# Patient Record
Sex: FEMALE | NOT HISPANIC OR LATINO | Employment: OTHER | URBAN - METROPOLITAN AREA
[De-identification: names, ages, dates, MRNs, and addresses within clinical notes are randomized per-mention and may not be internally consistent; named-entity substitution may affect disease eponyms.]

---

## 2017-03-31 ENCOUNTER — HOSPITAL ENCOUNTER (OUTPATIENT)
Dept: MAMMOGRAPHY | Facility: CLINIC | Age: 67
Discharge: HOME | End: 2017-03-31
Attending: PHYSICIAN ASSISTANT | Admitting: PHYSICIAN ASSISTANT
Payer: MEDICARE

## 2017-03-31 DIAGNOSIS — Z12.31 VISIT FOR SCREENING MAMMOGRAM: ICD-10-CM

## 2017-03-31 PROCEDURE — G0202 SCR MAMMO BI INCL CAD: HCPCS

## 2017-03-31 PROCEDURE — 77063 BREAST TOMOSYNTHESIS BI: CPT

## 2017-09-19 ENCOUNTER — HOSPITAL ENCOUNTER (OUTPATIENT)
Dept: BONE DENSITY | Facility: CLINIC | Age: 67
Discharge: HOME | End: 2017-09-19
Attending: PHYSICIAN ASSISTANT | Admitting: PHYSICIAN ASSISTANT
Payer: MEDICARE

## 2017-09-19 DIAGNOSIS — Z78.0 POST-MENOPAUSAL: ICD-10-CM

## 2017-09-19 DIAGNOSIS — M85.80 OSTEOPENIA: ICD-10-CM

## 2017-09-19 PROCEDURE — 77080 DXA BONE DENSITY AXIAL: CPT

## 2019-04-23 ENCOUNTER — THERAPY VISIT (OUTPATIENT)
Dept: PHYSICAL THERAPY | Facility: CLINIC | Age: 69
End: 2019-04-23
Payer: COMMERCIAL

## 2019-04-23 DIAGNOSIS — R29.898 LEG WEAKNESS, BILATERAL: ICD-10-CM

## 2019-04-23 PROCEDURE — 97110 THERAPEUTIC EXERCISES: CPT | Mod: GP | Performed by: PHYSICAL THERAPIST

## 2019-04-23 PROCEDURE — 97161 PT EVAL LOW COMPLEX 20 MIN: CPT | Mod: GP | Performed by: PHYSICAL THERAPIST

## 2019-04-23 NOTE — LETTER
[facility] ATHLETIC [facility] PHYSICAL THERAPY  [address] [place] [address]  [phone]    2019    Re: Axel Pearson   :   1950  MRN:  [id number]   REFERRING PHYSICIAN:   Donald Stanton    [facility] ATHLETIC [facility] PHYSICAL [facility]    Date of Initial Evaluation:  2019  Visits:  Rxs Used: 1  Reason for Referral:  Leg weakness, bilateral    EVALUATION SUMMARY    [facility] Athletic [facility] Initial Evaluation  Subjective:  The history is provided by the patient.   General   Condition requiring PT:  Deconditioning and history of previous falls  Associated condition:  Osteoporosis and fall  Chronicity:  Chronic  Onset date of current episode/exacerbation comment:  Patient fell while stepping onto first step on bus, landing on R knee on 2019. Patient reported she was unable to stand without assistance. Patient then sat on plane for 40 hours following the fall. Patient had an x-ray following the fall after return to [place], which showed that there was no fracture. Patient was prescribed a knee brace, which she wore up until 3 weeks ago. Patient still has knee pain, along with bilateral leg weakness. Patient explains that she has difficulty getting up from chairs and getting up from the floor. Patient also has autoimmune disorders and arthritis, which causes her pain in her hands and feet. Upon examination, patient has notable swelling in R knee and R calf.  Pain location:  Right knee, left hand/wrist, right hand/wrist, right foot/ankle and left foot/ankle (Pain in hands and feet from arthritis)  Pain quality:  Aching  Frequency:  Constant  Number scale:  5/10  Associated symptoms:  Loss of strength, loss of motion, edema and dizziness (blood pressure medication)  Pain is:  The same all the time  Symptoms exacerbated by:  Standing and activity  Symptoms relieved by:  Bracing/immobilizing and rest  Progression since onset:   "Unchanged  Barriers at home/work:  Nothing  Red flags:  None as reported by the patient  Objective:  Hip Evaluation  Hip PROM:  Hip PROM:  Left Hip:    Normal  Right Hip:  Normal  Hip Strength:    Flexion:   Left: 4+/5   +  Pain:  Right: 4+/5   +  Pain:     Re: Axel Pearson   :   1950    Knee Evaluation:  ROM:  Strength wnl knee: Patient has pain from pressure of hand on R during resistance strength testing.  AROM  Extension: Left:    Right:  15 deg, no pain  Flexion: Left:   Right: 130 deg, no pain  Strength:   Extension:  Left: 5/5    Pain:-      Right: 5/5    Pain:+  Flexion:  Left: 5/5   Pain:      Right: 5/5    Pain:+    ROS  Patient has localized edema over R patella and generalized swelling of entire R lower leg. Patient measures 15.25\" on R calf (6\" inferior to joint line) and 14.25\" on L calf (6\" inferior to joint line)  Assessment/Plan:    Patient is a 68 year old female with bilateral LE weakness complaints.    Patient has the following significant findings with corresponding treatment plan.                Diagnosis 1:  Bilateral LE weakness  Pain -  manual therapy, self management, education and home program  Decreased ROM/flexibility - manual therapy, therapeutic exercise and home program  Decreased strength - therapeutic exercise, therapeutic activities and home program  Edema - self management/home program  Decreased function - therapeutic activities and home program  Therapy Evaluation Codes:   Cumulative Therapy Evaluation is: Low complexity.  Previous and current functional limitations:  (See Goal Flow Sheet for this information)    Short term and Long term goals: (See Goal Flow Sheet for this information)   Communication ability:  Patient appears to be able to clearly communicate and understand verbal and written communication and follow directions correctly.  Treatment Explanation - The following has been discussed with the patient:   RX ordered/plan of care  Anticipated " outcomes  Possible risks and side effects  This patient would benefit from PT intervention to resume normal activities.   Rehab potential is good.  Frequency:  1 X week, once daily  Duration:  for 6 weeks  Discharge Plan:  Achieve all LTG.  Independent in home treatment program.  Reach maximal therapeutic benefit.     Thank you for your referral.    INQUIRIES  Therapist:Jerica Pedro, PT  [facility] PHYSICAL THERAPY  [address] [address]  Phone: [phone]  Fax: [phone]

## 2019-04-23 NOTE — PROGRESS NOTES
[facility] Initial Evaluation  Subjective:  The history is provided by the patient.   General   Condition requiring PT:  Deconditioning and history of previous falls  Associated condition:  Osteoporosis and fall  Chronicity:  Chronic  Onset date of current episode/exacerbation comment:  Patient fell while stepping onto first step on bus, landing on R knee on March 6, 2019. Patient reported she was unable to stand without assistance. Patient then sat on plane for 40 hours following the fall. Patient had an x-ray following the fall after return to [place], which showed that there was no fracture. Patient was prescribed a knee brace, which she wore up until 3 weeks ago. Patient still has knee pain, along with bilateral leg weakness. Patient explains that she has difficulty getting up from chairs and getting up from the floor. Patient also has autoimmune disorders and arthritis, which causes her pain in her hands and feet. Upon examination, patient has notable swelling in R knee and R calf.  Pain location:  Right knee, left hand/wrist, right hand/wrist, right foot/ankle and left foot/ankle (Pain in hands and feet from arthritis)  Pain quality:  Aching  Frequency:  Constant  Number scale:  5/10  Associated symptoms:  Loss of strength, loss of motion, edema and dizziness (blood pressure medication)  Pain is:  The same all the time  Symptoms exacerbated by:  Standing and activity  Symptoms relieved by:  Bracing/immobilizing and rest  Progression since onset:  Unchanged  Barriers at home/work:  Nothing  Red flags:  None as reported by the patient                      Objective:  System                                           Hip Evaluation  Hip PROM:  Hip PROM:  Left Hip:    Normal  Right Hip:  Normal                          Hip Strength:    Flexion:   Left: 4+/5   +  Pain:  Right: 4+/5   +  Pain:                                                 Knee Evaluation:  ROM:  Strength wnl knee: Patient has pain  "from pressure of hand on R during resistance strength testing.  AROM      Extension: Left:    Right:  15 deg, no pain  Flexion: Left:   Right: 130 deg, no pain        Strength:     Extension:  Left: 5/5    Pain:-      Right: 5/5    Pain:+  Flexion:  Left: 5/5   Pain:      Right: 5/5    Pain:+                                                                 ROS  Patient has localized edema over R patella and generalized swelling of entire R lower leg. Patient measures 15.25\" on R calf (6\" inferior to joint line) and 14.25\" on L calf (6\" inferior to joint line)    Assessment/Plan:    Patient is a 68 year old female with bilateral LE weakness complaints.    Patient has the following significant findings with corresponding treatment plan.                Diagnosis 1:  Bilateral LE weakness  Pain -  manual therapy, self management, education and home program  Decreased ROM/flexibility - manual therapy, therapeutic exercise and home program  Decreased strength - therapeutic exercise, therapeutic activities and home program  Edema - self management/home program  Decreased function - therapeutic activities and home program    Therapy Evaluation Codes:   Cumulative Therapy Evaluation is: Low complexity.    Previous and current functional limitations:  (See Goal Flow Sheet for this information)    Short term and Long term goals: (See Goal Flow Sheet for this information)     Communication ability:  Patient appears to be able to clearly communicate and understand verbal and written communication and follow directions correctly.  Treatment Explanation - The following has been discussed with the patient:   RX ordered/plan of care  Anticipated outcomes  Possible risks and side effects  This patient would benefit from PT intervention to resume normal activities.   Rehab potential is good.    Frequency:  1 X week, once daily  Duration:  for 6 weeks  Discharge Plan:  Achieve all LTG.  Independent in home treatment program.  Reach " maximal therapeutic benefit.    Please refer to the daily flowsheet for treatment today, total treatment time and time spent performing 1:1 timed codes.

## 2019-04-25 PROBLEM — R29.898 LEG WEAKNESS, BILATERAL: Status: ACTIVE | Noted: 2019-04-25

## 2019-04-26 NOTE — PROGRESS NOTES
[facility] Initial Evaluation  Subjective:                                     General health as reported by patient is fair.  Pertinent medical history includes:  Diabetes, high blood pressure, history of fractures, incontinence, osteoporosis, osteoarthritis, overweight, rheumatoid arthritis, thyroid problems and other (cold/hot extremity, Significant weakness).    Other surgeries include:  Other (c section).  Current medications:  Anti-inflammatory, high blood pressure medication, steroids and other (diabetes medications).                                      Objective:  System    Physical Exam    General     ROS    Assessment/Plan:

## 2019-04-30 ENCOUNTER — THERAPY VISIT (OUTPATIENT)
Dept: PHYSICAL THERAPY | Facility: CLINIC | Age: 69
End: 2019-04-30
Payer: COMMERCIAL

## 2019-04-30 DIAGNOSIS — R29.898 LEG WEAKNESS, BILATERAL: ICD-10-CM

## 2019-04-30 PROCEDURE — 97110 THERAPEUTIC EXERCISES: CPT | Mod: GP | Performed by: PHYSICAL THERAPIST

## 2019-04-30 PROCEDURE — 97530 THERAPEUTIC ACTIVITIES: CPT | Mod: GP | Performed by: PHYSICAL THERAPIST

## 2019-05-10 ENCOUNTER — THERAPY VISIT (OUTPATIENT)
Dept: PHYSICAL THERAPY | Facility: CLINIC | Age: 69
End: 2019-05-10
Payer: COMMERCIAL

## 2019-05-10 DIAGNOSIS — R29.898 LEG WEAKNESS, BILATERAL: ICD-10-CM

## 2019-05-10 PROCEDURE — 97530 THERAPEUTIC ACTIVITIES: CPT | Mod: GP | Performed by: PHYSICAL THERAPIST

## 2019-05-10 PROCEDURE — 97110 THERAPEUTIC EXERCISES: CPT | Mod: GP | Performed by: PHYSICAL THERAPIST

## 2019-05-24 ENCOUNTER — THERAPY VISIT (OUTPATIENT)
Dept: PHYSICAL THERAPY | Facility: CLINIC | Age: 69
End: 2019-05-24
Payer: COMMERCIAL

## 2019-05-24 DIAGNOSIS — R29.898 LEG WEAKNESS, BILATERAL: ICD-10-CM

## 2019-05-24 PROCEDURE — 97110 THERAPEUTIC EXERCISES: CPT | Mod: GP | Performed by: PHYSICAL THERAPIST

## 2019-05-24 PROCEDURE — 97530 THERAPEUTIC ACTIVITIES: CPT | Mod: GP | Performed by: PHYSICAL THERAPIST

## 2019-07-15 ENCOUNTER — THERAPY VISIT (OUTPATIENT)
Dept: PHYSICAL THERAPY | Facility: CLINIC | Age: 69
End: 2019-07-15
Payer: COMMERCIAL

## 2019-07-15 DIAGNOSIS — R29.898 LEG WEAKNESS, BILATERAL: ICD-10-CM

## 2019-07-15 PROCEDURE — 97110 THERAPEUTIC EXERCISES: CPT | Mod: GP | Performed by: PHYSICAL THERAPIST

## 2019-07-15 PROCEDURE — 97530 THERAPEUTIC ACTIVITIES: CPT | Mod: GP | Performed by: PHYSICAL THERAPIST

## 2019-07-15 ASSESSMENT — ACTIVITIES OF DAILY LIVING (ADL)
SQUAT: ACTIVITY IS NOT DIFFICULT
SIT WITH YOUR KNEE BENT: ACTIVITY IS MINIMALLY DIFFICULT
KNEE_ACTIVITY_OF_DAILY_LIVING_SUM: 64
STIFFNESS: I HAVE THE SYMPTOM BUT IT DOES NOT AFFECT MY ACTIVITY
HOW_WOULD_YOU_RATE_THE_OVERALL_FUNCTION_OF_YOUR_KNEE_DURING_YOUR_USUAL_DAILY_ACTIVITIES?: NEARLY NORMAL
KNEE_ACTIVITY_OF_DAILY_LIVING_SCORE: 91.43
SWELLING: I HAVE THE SYMPTOM BUT IT DOES NOT AFFECT MY ACTIVITY
RAW_SCORE: 64
GIVING WAY, BUCKLING OR SHIFTING OF KNEE: I DO NOT HAVE THE SYMPTOM
RISE FROM A CHAIR: ACTIVITY IS MINIMALLY DIFFICULT
WALK: ACTIVITY IS NOT DIFFICULT
KNEEL ON THE FRONT OF YOUR KNEE: ACTIVITY IS NOT DIFFICULT
STAND: ACTIVITY IS NOT DIFFICULT
WEAKNESS: I HAVE THE SYMPTOM BUT IT DOES NOT AFFECT MY ACTIVITY
AS_A_RESULT_OF_YOUR_KNEE_INJURY,_HOW_WOULD_YOU_RATE_YOUR_CURRENT_LEVEL_OF_DAILY_ACTIVITY?: NEARLY NORMAL
GO UP STAIRS: ACTIVITY IS NOT DIFFICULT
PAIN: I HAVE THE SYMPTOM BUT IT DOES NOT AFFECT MY ACTIVITY
LIMPING: I DO NOT HAVE THE SYMPTOM
GO DOWN STAIRS: ACTIVITY IS NOT DIFFICULT
HOW_WOULD_YOU_RATE_THE_CURRENT_FUNCTION_OF_YOUR_KNEE_DURING_YOUR_USUAL_DAILY_ACTIVITIES_ON_A_SCALE_FROM_0_TO_100_WITH_100_BEING_YOUR_LEVEL_OF_KNEE_FUNCTION_PRIOR_TO_YOUR_INJURY_AND_0_BEING_THE_INABILITY_TO_PERFORM_ANY_OF_YOUR_USUAL_DAILY_ACTIVITIES?: 85

## 2019-07-16 NOTE — PROGRESS NOTES
"DISCHARGE REPORT    Progress reporting period is from 4-23-19 to 7-15-19.       SUBJECTIVE  Subjective changes noted by patient: Pt reported she has been dealing with issues related to the TMJ so has not been in to therapy-last visit 5-24-19.  Reports she is continuing with her HEP 2x/week. No overall complaints with the knee(s).    Current pain level is  0/10.     Previous pain level was  5/10.  Changes in function:  Yes (See Goal flowsheet attached for changes in current functional level)  Adverse reaction to treatment or activity: None    OBJECTIVE  Objective: Able to ascend/descend stairs with a reciprocal gait; no issues noted with ascending on the L; mild weakness noted with the R. Able to ascend a 7\" step without UE support without deviation. Knee ROM: flexion-130 degrees R; 131 degrees L. Knee extension-0 degrees R; 4 degrees hyperextension L. Continued observation of swelling proximal to patella-without pain to palpation. Absence of swelling R lower leg.     ASSESSMENT/PLAN  Updated problem list and treatment plan: Diagnosis 1:  Knee pain, R>L    STG/LTGs have been met or progress has been made towards goals:  Yes (See Goal flow sheet completed today.)  Assessment of Progress: The patient has met all of their long term goals.  Self Management Plans:  Patient is independent in a home treatment program.  Axel continues to require the following intervention to meet STG and LTG's:  PT intervention is no longer required to meet STG/LTG.    Recommendations:  This patient is ready to be discharged from therapy and continue their home treatment program.    Please refer to the daily flowsheet for treatment today, total treatment time and time spent performing 1:1 timed codes.        "

## 2019-11-04 PROBLEM — R29.898 LEG WEAKNESS, BILATERAL: Status: RESOLVED | Noted: 2019-04-25 | Resolved: 2019-11-04

## 2020-06-14 ENCOUNTER — HOSPITAL ENCOUNTER (EMERGENCY)
Facility: CLINIC | Age: 70
Discharge: HOME | End: 2020-06-14
Attending: EMERGENCY MEDICINE | Admitting: EMERGENCY MEDICINE
Payer: COMMERCIAL

## 2020-06-14 VITALS
HEART RATE: 119 BPM | OXYGEN SATURATION: 97 % | DIASTOLIC BLOOD PRESSURE: 137 MMHG | TEMPERATURE: 97.3 F | RESPIRATION RATE: 18 BRPM | BODY MASS INDEX: 29.02 KG/M2 | WEIGHT: 170 LBS | HEIGHT: 64 IN | SYSTOLIC BLOOD PRESSURE: 158 MMHG

## 2020-06-14 DIAGNOSIS — E87.6 HYPOKALEMIA: ICD-10-CM

## 2020-06-14 DIAGNOSIS — R42 DIZZINESS: ICD-10-CM

## 2020-06-14 LAB
ALBUMIN SERPL-MCNC: 3.6 G/DL (ref 3.4–5)
ALBUMIN UR-MCNC: NEGATIVE MG/DL
ALP SERPL-CCNC: 106 U/L (ref 40–150)
ALT SERPL W P-5'-P-CCNC: 28 U/L (ref 0–50)
ANION GAP SERPL CALCULATED.3IONS-SCNC: 10 MMOL/L (ref 3–14)
APPEARANCE UR: CLEAR
AST SERPL W P-5'-P-CCNC: 18 U/L (ref 0–45)
BASOPHILS # BLD AUTO: 0 10E9/L (ref 0–0.2)
BASOPHILS NFR BLD AUTO: 0.2 %
BILIRUB SERPL-MCNC: 0.6 MG/DL (ref 0.2–1.3)
BILIRUB UR QL STRIP: NEGATIVE
BUN SERPL-MCNC: 17 MG/DL (ref 7–30)
CALCIUM SERPL-MCNC: 9.6 MG/DL (ref 8.5–10.1)
CHLORIDE SERPL-SCNC: 102 MMOL/L (ref 94–109)
CO2 SERPL-SCNC: 24 MMOL/L (ref 20–32)
COLOR UR AUTO: ABNORMAL
CREAT SERPL-MCNC: 0.85 MG/DL (ref 0.52–1.04)
DIFFERENTIAL METHOD BLD: NORMAL
EOSINOPHIL # BLD AUTO: 0.2 10E9/L (ref 0–0.7)
EOSINOPHIL NFR BLD AUTO: 1.6 %
ERYTHROCYTE [DISTWIDTH] IN BLOOD BY AUTOMATED COUNT: 14.6 % (ref 10–15)
GFR SERPL CREATININE-BSD FRML MDRD: 70 ML/MIN/{1.73_M2}
GLUCOSE SERPL-MCNC: 159 MG/DL (ref 70–99)
GLUCOSE UR STRIP-MCNC: NEGATIVE MG/DL
HCT VFR BLD AUTO: 37.5 % (ref 35–47)
HGB BLD-MCNC: 12.5 G/DL (ref 11.7–15.7)
HGB UR QL STRIP: NEGATIVE
IMM GRANULOCYTES # BLD: 0 10E9/L (ref 0–0.4)
IMM GRANULOCYTES NFR BLD: 0.4 %
INTERPRETATION ECG - MUSE: NORMAL
KETONES UR STRIP-MCNC: 10 MG/DL
LEUKOCYTE ESTERASE UR QL STRIP: NEGATIVE
LIPASE SERPL-CCNC: 158 U/L (ref 73–393)
LYMPHOCYTES # BLD AUTO: 2 10E9/L (ref 0.8–5.3)
LYMPHOCYTES NFR BLD AUTO: 18.8 %
MCH RBC QN AUTO: 30.4 PG (ref 26.5–33)
MCHC RBC AUTO-ENTMCNC: 33.3 G/DL (ref 31.5–36.5)
MCV RBC AUTO: 91 FL (ref 78–100)
MONOCYTES # BLD AUTO: 0.8 10E9/L (ref 0–1.3)
MONOCYTES NFR BLD AUTO: 7.3 %
NEUTROPHILS # BLD AUTO: 7.4 10E9/L (ref 1.6–8.3)
NEUTROPHILS NFR BLD AUTO: 71.7 %
NITRATE UR QL: NEGATIVE
NRBC # BLD AUTO: 0 10*3/UL
NRBC BLD AUTO-RTO: 0 /100
PH UR STRIP: 6.5 PH (ref 5–7)
PLATELET # BLD AUTO: 317 10E9/L (ref 150–450)
POTASSIUM SERPL-SCNC: 3.3 MMOL/L (ref 3.4–5.3)
PROT SERPL-MCNC: 7.7 G/DL (ref 6.8–8.8)
RBC # BLD AUTO: 4.11 10E12/L (ref 3.8–5.2)
RBC #/AREA URNS AUTO: 0 /HPF (ref 0–2)
SODIUM SERPL-SCNC: 136 MMOL/L (ref 133–144)
SOURCE: ABNORMAL
SP GR UR STRIP: 1.01 (ref 1–1.03)
TROPONIN I SERPL-MCNC: <0.015 UG/L (ref 0–0.04)
UROBILINOGEN UR STRIP-MCNC: NORMAL MG/DL (ref 0–2)
WBC # BLD AUTO: 10.4 10E9/L (ref 4–11)
WBC #/AREA URNS AUTO: 1 /HPF (ref 0–5)

## 2020-06-14 PROCEDURE — 81001 URINALYSIS AUTO W/SCOPE: CPT | Performed by: EMERGENCY MEDICINE

## 2020-06-14 PROCEDURE — 93005 ELECTROCARDIOGRAM TRACING: CPT

## 2020-06-14 PROCEDURE — 96360 HYDRATION IV INFUSION INIT: CPT

## 2020-06-14 PROCEDURE — 80053 COMPREHEN METABOLIC PANEL: CPT | Performed by: EMERGENCY MEDICINE

## 2020-06-14 PROCEDURE — 25000132 ZZH RX MED GY IP 250 OP 250 PS 637: Performed by: EMERGENCY MEDICINE

## 2020-06-14 PROCEDURE — 83690 ASSAY OF LIPASE: CPT | Performed by: EMERGENCY MEDICINE

## 2020-06-14 PROCEDURE — 25800030 ZZH RX IP 258 OP 636: Performed by: EMERGENCY MEDICINE

## 2020-06-14 PROCEDURE — 99284 EMERGENCY DEPT VISIT MOD MDM: CPT | Mod: 25

## 2020-06-14 PROCEDURE — 84484 ASSAY OF TROPONIN QUANT: CPT | Performed by: EMERGENCY MEDICINE

## 2020-06-14 PROCEDURE — 85025 COMPLETE CBC W/AUTO DIFF WBC: CPT | Performed by: EMERGENCY MEDICINE

## 2020-06-14 PROCEDURE — 96361 HYDRATE IV INFUSION ADD-ON: CPT

## 2020-06-14 RX ORDER — POTASSIUM CHLORIDE 1.5 G/1.58G
20 POWDER, FOR SOLUTION ORAL ONCE
Status: DISCONTINUED | OUTPATIENT
Start: 2020-06-14 | End: 2020-06-14

## 2020-06-14 RX ORDER — POTASSIUM CHLORIDE 1.5 G/1.58G
20 POWDER, FOR SOLUTION ORAL ONCE
Status: COMPLETED | OUTPATIENT
Start: 2020-06-14 | End: 2020-06-14

## 2020-06-14 RX ORDER — ONDANSETRON 2 MG/ML
4 INJECTION INTRAMUSCULAR; INTRAVENOUS EVERY 30 MIN PRN
Status: DISCONTINUED | OUTPATIENT
Start: 2020-06-14 | End: 2020-06-15 | Stop reason: HOSPADM

## 2020-06-14 RX ORDER — SODIUM CHLORIDE 9 MG/ML
1000 INJECTION, SOLUTION INTRAVENOUS CONTINUOUS
Status: DISCONTINUED | OUTPATIENT
Start: 2020-06-14 | End: 2020-06-15 | Stop reason: HOSPADM

## 2020-06-14 RX ADMIN — SODIUM CHLORIDE 1000 ML: 9 INJECTION, SOLUTION INTRAVENOUS at 20:09

## 2020-06-14 RX ADMIN — POTASSIUM CHLORIDE 20 MEQ: 1.5 POWDER, FOR SOLUTION ORAL at 23:07

## 2020-06-14 ASSESSMENT — ENCOUNTER SYMPTOMS
NAUSEA: 1
FEVER: 0
TROUBLE SWALLOWING: 0
DIFFICULTY URINATING: 1
DIZZINESS: 0
DYSURIA: 0
VOMITING: 1
LIGHT-HEADEDNESS: 1
COUGH: 0
HEMATURIA: 0
SPEECH DIFFICULTY: 0
SHORTNESS OF BREATH: 0
ABDOMINAL PAIN: 0

## 2020-06-14 ASSESSMENT — MIFFLIN-ST. JEOR: SCORE: 1286.11

## 2020-06-14 NOTE — ED AVS SNAPSHOT
Emergency Department  [address] [facility] [address]  Phone:  [phone]  Fax:  [phone]                                    Axel Pearson   MRN: [id number]    Department:   Emergency Department   Date of Visit:  6/14/2020           After Visit Summary Signature Page    I have received my discharge instructions, and my questions have been answered. I have discussed any challenges I see with this plan with the nurse or doctor.    ..........................................................................................................................................  Patient/Patient Representative Signature      ..........................................................................................................................................  Patient Representative Print Name and Relationship to Patient    ..................................................               ................................................  Date                                   Time    ..........................................................................................................................................  Reviewed by Signature/Title    ...................................................              ..............................................  Date                                               Time          22EPIC Rev 08/18

## 2020-06-15 NOTE — ED NOTES
RN was watching patient on cardiac monitor when patient suddenly went into an episode of V tach lasting about 5 seconds. Patient then went out of the rhythm and went back into it again for another episode lasting about 5 more seconds. MD aware. Some of the rhythm was caught on cardiac strips. MD visualized strips

## 2020-06-15 NOTE — ED PROVIDER NOTES
History   Chief Complaint:  Lightheadedness and Vomiting    HPI   Axel Pearson is a 69 year old female, with a history of DM II, hypertension, migraine, anxiety, and hypercholesteremia, who presents to the ED for evaluation of lightheadedness and vomiting. The patient reports the gradual onset of lightheadedness, worse than baseline, beginning about an hour and a half ago. She states she was standing in the kitchen, preparing food, when she began to feel lightheaded. She went to sit down and then felt stronger wave of lightheadedness. She then proceeded to take a shower, which she then vomited once. There was no blood in the vomit. The patient denies feeling as if the room is spinning or she is spinning. The patient denies any double vision, tinnitus, speech abnormalities, or swallowing difficulties. She also mentions a brief 2-3 minutes episode of chest pain that began this morning. The pain was right below her left breast and felt as if there was pain in her back as well. The patient does convey some inability to urinate for the past few hours as well, but denies hematuria or dysuria. She denies any cough, fever, shortness of breath, abdominal pain, or any other acute symptoms.    Allergies:  No known drug allergies    Medications:    Aspirin  Methotrexate  Norvasc  Toprol  Valsartan  Metformin    Past Medical History:    DM II  Migraine  Anxiety  Hypertension  Osteopenia  Hypercholesteremia    Past Surgical History:    Cholecystectomy   section x2    Family History:    Diabetes    Social History:  Smoking status: Never  Alcohol use: No  Drug use: No  PCP: [facility]  Marital Status:   [2]    Review of Systems   Constitutional: Negative for fever.   HENT: Negative for trouble swallowing.    Eyes: Negative for visual disturbance.   Respiratory: Negative for cough and shortness of breath.    Cardiovascular: Positive for chest pain.   Gastrointestinal: Positive for nausea and  "vomiting. Negative for abdominal pain.   Genitourinary: Positive for difficulty urinating. Negative for dysuria and hematuria.   Neurological: Positive for light-headedness. Negative for dizziness and speech difficulty.   All other systems reviewed and are negative.    Physical Exam     Patient Vitals for the past 24 hrs:   BP Temp Temp src Pulse Heart Rate Resp SpO2 Height Weight   06/14/20 2250 -- -- -- -- -- -- 97 % -- --   06/14/20 2245 -- -- -- -- -- -- 97 % -- --   06/14/20 2240 -- -- -- -- -- -- 96 % -- --   06/14/20 2235 -- -- -- -- -- -- 96 % -- --   06/14/20 2230 (!) 158/137 -- -- 119 -- -- 95 % -- --   06/14/20 2210 (!) 155/95 -- -- 101 96 -- 96 % -- --   06/14/20 2200 -- -- -- 102 92 -- 96 % -- --   06/14/20 2150 -- -- -- -- 82 -- 96 % -- --   06/14/20 2140 -- -- -- -- 82 -- 96 % -- --   06/14/20 2130 (!) 151/87 -- -- 86 80 -- 97 % -- --   06/14/20 1949 (!) 140/104 97.3  F (36.3  C) Oral -- 90 18 98 % 1.626 m (5' 4\") 77.1 kg (170 lb)       Physical Exam  Constitutional: Heavy set, south  female, laying on side.  HENT: No signs of trauma. Oropharynx is moist.  Eyes: EOM are normal. Pupils are equal, round, and reactive to light. No nystagmus.  Neck: Normal range of motion. No JVD present. No cervical adenopathy.  Cardiovascular: Regular rhythm.  Exam reveals no gallop and no friction rub.    No murmur heard.  Pulmonary/Chest: Bilateral breath sounds normal. No wheezes, rhonchi or rales.  Abdominal: Soft. No tenderness. No rebound or guarding. No CVA tenderness. 2+ femoral pulses.   Musculoskeletal: No edema. No tenderness.   Lymphadenopathy: No lymphadenopathy.   Neurological: Alert and oriented to person, place, and time. Normal strength. Coordination normal.   Skin: Skin is warm and dry. No rash noted. No erythema.     Emergency Department Course   ECG (20:14:26):  Rate 84 bpm. VT interval 220. QRS duration 96. QT/QTc 414/489. P-R-T axes 44 -13 47. Sinus rhythm with 1st degree AV block with " occasional premature ventricular complexes. LAD. Interpreted by Asael Bautista MD.    Laboratory:  Laboratory findings were communicated with the patient who voiced understanding of the findings.    CBC: WBC 10.4, HGB 12.5,     CMP: K 3.3, , Creatinine 0.85    Troponin: <0.015    Lipase: 158    UA: Ketone 10    Interventions:  2009: NS 1L IV Bolus   2307: Potassium chloride 20 mEq PO    Emergency Department Course:  Past medical records, nursing notes, and vitals reviewed.    1958 I performed an exam of the patient as documented above.     EKG obtained in the ED, see results above.   IV was inserted and blood was drawn for laboratory testing, results above.  The patient provided a urine sample here in the emergency department. This was sent for laboratory testing, findings above.    2200 I rechecked the patient and discussed the results of her workup thus far.     Findings and plan explained to the Patient. Patient discharged home with instructions regarding supportive care, medications, and reasons to return. The importance of close follow-up was reviewed.    I personally reviewed the laboratory and imaging results with the Patient and answered all related questions prior to discharge.     Impression & Plan   Medical Decision Making:  Axel Pearson is a 69 year old woman who felt lightheaded today. There was no spinning of the room or anything else. She had some slight nausea. She did not pass out. Earlier today, she had about a 2 minute episode of chest discomfort under her left breast. She thinks her medications are making her dizzy and lightheaded, and she has had this going on for a while, but it seemed worse today. Her physical exam is non-focal. She was placed on monitor. Her EKG showed sinus rhythm. Her labs showed a very mild hypokalemia. She received IV fluids and nausea medicine and is now feeling much better. She had 3-4 beat run on the monitor that was uncertain and may have been  a narrow complex tachycardia. The nurse showed me the rhythm strip and I did not see any wide complex rhythm to be consistent with v-tach and in fact I did not see any tachycardia on the rhythm strip obtained. Patient's labs came back as noted. She feels much better. Last bp was a false reading.  I have recommended she follow up with her primary and if symptoms worsen recheck in the emergency department.     Diagnosis:    ICD-10-CM    1. Dizziness  R42    2. Hypokalemia  E87.6        Disposition:  Discharged to home.    Scribe Disclosure:  I, Damien Wells, am serving as a scribe at 7:58 PM on 6/14/2020 to document services personally performed by Asael Bautista MD based on my observations and the provider's statements to me.        Asael Bautista MD  06/15/20 0038

## 2020-06-15 NOTE — ED TRIAGE NOTES
Pt. complains of dizziness since yesterday, vomit times one today. Pt. states having left CP lasting a few minutes yesterday. No COVID symptoms.

## 2021-09-07 PROCEDURE — 88311 DECALCIFY TISSUE: CPT | Mod: TC,ORL,91 | Performed by: OTOLARYNGOLOGY

## 2021-09-07 PROCEDURE — 88304 TISSUE EXAM BY PATHOLOGIST: CPT | Mod: TC,ORL | Performed by: OTOLARYNGOLOGY

## 2021-09-08 ENCOUNTER — LAB REQUISITION (OUTPATIENT)
Dept: LAB | Facility: CLINIC | Age: 71
End: 2021-09-08
Payer: COMMERCIAL

## 2021-09-13 LAB
PATH REPORT.COMMENTS IMP SPEC: NORMAL
PATH REPORT.COMMENTS IMP SPEC: NORMAL
PATH REPORT.FINAL DX SPEC: NORMAL
PATH REPORT.GROSS SPEC: NORMAL
PATH REPORT.MICROSCOPIC SPEC OTHER STN: NORMAL
PATH REPORT.RELEVANT HX SPEC: NORMAL
PHOTO IMAGE: NORMAL

## 2021-09-13 PROCEDURE — 88312 SPECIAL STAINS GROUP 1: CPT | Mod: 26 | Performed by: PATHOLOGY

## 2021-09-13 PROCEDURE — 88311 DECALCIFY TISSUE: CPT | Mod: 26 | Performed by: PATHOLOGY

## 2021-09-13 PROCEDURE — 88305 TISSUE EXAM BY PATHOLOGIST: CPT | Mod: 26 | Performed by: PATHOLOGY

## 2022-05-20 ENCOUNTER — ANCILLARY PROCEDURE (OUTPATIENT)
Dept: CT IMAGING | Facility: CLINIC | Age: 72
End: 2022-05-20
Attending: PHYSICIAN ASSISTANT
Payer: COMMERCIAL

## 2022-05-20 DIAGNOSIS — R93.89 ABNORMAL X-RAY: ICD-10-CM

## 2022-05-20 LAB
CREAT BLD-MCNC: 0.7 MG/DL (ref 0.5–1)
GFR SERPL CREATININE-BSD FRML MDRD: >60 ML/MIN/1.73M2

## 2022-05-20 PROCEDURE — 250N000011 HC RX IP 250 OP 636: Performed by: PHYSICIAN ASSISTANT

## 2022-05-20 PROCEDURE — 82565 ASSAY OF CREATININE: CPT

## 2022-05-20 PROCEDURE — 71260 CT THORAX DX C+: CPT

## 2022-05-20 RX ORDER — IOPAMIDOL 755 MG/ML
75 INJECTION, SOLUTION INTRAVASCULAR ONCE
Status: COMPLETED | OUTPATIENT
Start: 2022-05-20 | End: 2022-05-20

## 2022-05-20 RX ADMIN — IOPAMIDOL 100 ML: 755 INJECTION, SOLUTION INTRAVENOUS at 16:02

## 2022-05-27 ENCOUNTER — HOSPITAL ENCOUNTER (OUTPATIENT)
Dept: MAMMOGRAPHY | Facility: CLINIC | Age: 72
Discharge: HOME | End: 2022-05-27
Attending: PHYSICIAN ASSISTANT | Admitting: PHYSICIAN ASSISTANT
Payer: COMMERCIAL

## 2022-05-27 DIAGNOSIS — Z12.31 VISIT FOR SCREENING MAMMOGRAM: ICD-10-CM

## 2022-05-27 PROCEDURE — 77067 SCR MAMMO BI INCL CAD: CPT

## 2024-04-09 ENCOUNTER — TELEPHONE (OUTPATIENT)
Dept: PHYSICAL MEDICINE AND REHAB | Facility: CLINIC | Age: 74
End: 2024-04-09
Payer: COMMERCIAL

## 2024-04-09 NOTE — TELEPHONE ENCOUNTER
[facility] Call Center    Phone Message    May a detailed message be left on voicemail: yes     Reason for Call: Appointment Intake    Referring Provider Name: Self  Diagnosis and/or Symptoms: Herniated Disc    Pt called asking about spine clinic, explained that she has herniated disc and wants to see a doctor as she has been trying PT and it hasn't been working. Writer not sure if neurosurgery spine would be a better fit or if PMR would work. Please review and call back to schedule or suggest if Pt should get neurosurgery referral. Pt prefers [place] location     Action Taken: Message routed to:  [facility] PM[facility]    Travel Screening: Not Applicable

## 2024-04-10 NOTE — TELEPHONE ENCOUNTER
Returned call to pt, had to M, asked if she could call back and get scheduled with Carlota Bustillo NP for a consult.